# Patient Record
Sex: FEMALE | URBAN - METROPOLITAN AREA
[De-identification: names, ages, dates, MRNs, and addresses within clinical notes are randomized per-mention and may not be internally consistent; named-entity substitution may affect disease eponyms.]

---

## 2018-04-27 ENCOUNTER — APPOINTMENT (OUTPATIENT)
Dept: OTHER | Facility: HOSPITAL | Age: 38
End: 2018-04-27
Attending: PREVENTIVE MEDICINE

## 2022-04-19 ENCOUNTER — OFFICE VISIT (OUTPATIENT)
Dept: UROLOGY | Facility: CLINIC | Age: 42
End: 2022-04-19
Attending: OBSTETRICS & GYNECOLOGY
Payer: COMMERCIAL

## 2022-04-19 VITALS — TEMPERATURE: 98 F | BODY MASS INDEX: 21.67 KG/M2 | HEIGHT: 68 IN | WEIGHT: 143 LBS

## 2022-04-19 DIAGNOSIS — N81.6 RECTOCELE: ICD-10-CM

## 2022-04-19 DIAGNOSIS — N81.11 CYSTOCELE, MIDLINE: ICD-10-CM

## 2022-04-19 DIAGNOSIS — N81.84 PELVIC MUSCLE WASTING: Primary | ICD-10-CM

## 2022-04-19 DIAGNOSIS — N39.3 FEMALE STRESS INCONTINENCE: ICD-10-CM

## 2022-04-19 PROCEDURE — 87086 URINE CULTURE/COLONY COUNT: CPT | Performed by: OBSTETRICS & GYNECOLOGY

## 2022-04-19 PROCEDURE — 99202 OFFICE O/P NEW SF 15 MIN: CPT

## 2022-04-19 RX ORDER — VALACYCLOVIR HYDROCHLORIDE 500 MG/1
500 TABLET, FILM COATED ORAL 2 TIMES DAILY
COMMUNITY

## 2022-04-19 RX ORDER — ALPRAZOLAM 0.25 MG/1
0.25 TABLET ORAL NIGHTLY PRN
COMMUNITY

## 2022-04-21 ENCOUNTER — TELEPHONE (OUTPATIENT)
Dept: UROLOGY | Facility: CLINIC | Age: 42
End: 2022-04-21

## 2022-10-04 ENCOUNTER — VIRTUAL PHONE E/M (OUTPATIENT)
Dept: UROLOGY | Facility: CLINIC | Age: 42
End: 2022-10-04
Attending: OBSTETRICS & GYNECOLOGY
Payer: COMMERCIAL

## 2022-10-04 ENCOUNTER — TELEPHONE (OUTPATIENT)
Dept: UROLOGY | Facility: CLINIC | Age: 42
End: 2022-10-04

## 2022-10-04 DIAGNOSIS — N81.84 PELVIC MUSCLE WASTING: ICD-10-CM

## 2022-10-04 DIAGNOSIS — N81.11 CYSTOCELE, MIDLINE: ICD-10-CM

## 2022-10-04 DIAGNOSIS — N81.6 RECTOCELE: ICD-10-CM

## 2022-10-04 DIAGNOSIS — N39.3 FEMALE STRESS INCONTINENCE: Primary | ICD-10-CM

## 2022-10-04 NOTE — PROGRESS NOTES
Patient to follow up UI  Given circumstances surrounding COVID-19 this visit is being conducted as a televisit with pt's consent. Pt in safe, private environment for televisit, provider located in the office setting    She is currently using pelvic floor PT ([facility] in [facility])  Completed 8 sessions  Leakage worst with jumping  Very active  Frustrated about persistent leakage    Denies bulge    There were no vitals taken for this visit.       Impression/Plan:  (N39.3) Female stress incontinence  (primary encounter diagnosis)    (N81.84) Pelvic muscle wasting    (N81.11) Cystocele, midline    (N81.6) Rectocele      Discussion Items:   Nonsurgical and surgical treatments for Stress Urinary Incontinence  Pelvic muscle rehabilitation including pelvic floor PT      Treatment Plan, Non-surgical:   Pessary  PT    Treatment Plan, Surgical:   Mid-urethral slings (Trans Vaginal Taping, TOT, single-incision)    All questions answered  She understands and agrees to plan    Return for pessary trial.    Nina Bowman, DO, FACOG, FACS

## 2022-11-03 ENCOUNTER — OFFICE VISIT (OUTPATIENT)
Dept: UROLOGY | Facility: CLINIC | Age: 42
End: 2022-11-03
Attending: OBSTETRICS & GYNECOLOGY
Payer: COMMERCIAL

## 2022-11-03 VITALS — BODY MASS INDEX: 21.67 KG/M2 | HEIGHT: 68 IN | TEMPERATURE: 98 F | WEIGHT: 143 LBS

## 2022-11-03 DIAGNOSIS — N81.6 RECTOCELE: ICD-10-CM

## 2022-11-03 DIAGNOSIS — N81.11 CYSTOCELE, MIDLINE: ICD-10-CM

## 2022-11-03 DIAGNOSIS — N39.3 FEMALE STRESS INCONTINENCE: Primary | ICD-10-CM

## 2022-11-03 PROCEDURE — 57160 INSERT PESSARY/OTHER DEVICE: CPT | Performed by: STUDENT IN AN ORGANIZED HEALTH CARE EDUCATION/TRAINING PROGRAM

## 2022-11-03 PROCEDURE — 99212 OFFICE O/P EST SF 10 MIN: CPT
